# Patient Record
Sex: MALE | Race: WHITE | Employment: FULL TIME | URBAN - METROPOLITAN AREA
[De-identification: names, ages, dates, MRNs, and addresses within clinical notes are randomized per-mention and may not be internally consistent; named-entity substitution may affect disease eponyms.]

---

## 2017-04-11 ENCOUNTER — HOSPITAL ENCOUNTER (OUTPATIENT)
Age: 37
Discharge: HOME | End: 2017-04-11
Attending: EMERGENCY MEDICINE
Payer: MEDICAID

## 2017-04-11 ENCOUNTER — APPOINTMENT (OUTPATIENT)
Dept: GENERAL RADIOLOGY | Age: 37
End: 2017-04-11
Attending: EMERGENCY MEDICINE
Payer: MEDICAID

## 2017-04-11 VITALS
WEIGHT: 138 LBS | SYSTOLIC BLOOD PRESSURE: 133 MMHG | DIASTOLIC BLOOD PRESSURE: 83 MMHG | HEIGHT: 67 IN | RESPIRATION RATE: 16 BRPM | HEART RATE: 84 BPM | TEMPERATURE: 99 F | BODY MASS INDEX: 21.66 KG/M2 | OXYGEN SATURATION: 98 %

## 2017-04-11 DIAGNOSIS — S60.022A CONTUSION OF LEFT INDEX FINGER WITHOUT DAMAGE TO NAIL, INITIAL ENCOUNTER: Primary | ICD-10-CM

## 2017-04-11 PROCEDURE — 73140 X-RAY EXAM OF FINGER(S): CPT

## 2017-04-11 PROCEDURE — 99203 OFFICE O/P NEW LOW 30 MIN: CPT

## 2017-04-11 NOTE — ED PROVIDER NOTES
No chief complaint on file. HPI:     Mary Monsivais is a 43year old male who presents with chief complaint of finger injury. A couple of days ago pt got the tip of his L index finger caught between two [employer]. He has iced it.   States toda dislocations or significant arthropathy noted. No joint effusion. No evidence of underlying osseous or soft tissue lesions.  No soft tissue swelling.      4/11/2017  CONCLUSION:  Negative exam.    Dictated by: Star Scott DO on 4/11/2017 at 16:14     [place]

## 2017-11-01 ENCOUNTER — HOSPITAL ENCOUNTER (OUTPATIENT)
Age: 37
Discharge: HOME | End: 2017-11-01
Attending: FAMILY MEDICINE
Payer: MEDICAID

## 2017-11-01 VITALS
HEIGHT: 67 IN | DIASTOLIC BLOOD PRESSURE: 92 MMHG | BODY MASS INDEX: 22.76 KG/M2 | OXYGEN SATURATION: 98 % | RESPIRATION RATE: 18 BRPM | WEIGHT: 145 LBS | TEMPERATURE: 98 F | SYSTOLIC BLOOD PRESSURE: 147 MMHG | HEART RATE: 83 BPM

## 2017-11-01 DIAGNOSIS — J34.0 ABSCESS OF NASAL CAVITY: Primary | ICD-10-CM

## 2017-11-01 PROCEDURE — 87205 SMEAR GRAM STAIN: CPT | Performed by: FAMILY MEDICINE

## 2017-11-01 PROCEDURE — 87147 CULTURE TYPE IMMUNOLOGIC: CPT | Performed by: FAMILY MEDICINE

## 2017-11-01 PROCEDURE — 87070 CULTURE OTHR SPECIMN AEROBIC: CPT | Performed by: FAMILY MEDICINE

## 2017-11-01 PROCEDURE — 87186 SC STD MICRODIL/AGAR DIL: CPT | Performed by: FAMILY MEDICINE

## 2017-11-01 PROCEDURE — 99214 OFFICE O/P EST MOD 30 MIN: CPT

## 2017-11-01 RX ORDER — CLINDAMYCIN HYDROCHLORIDE 300 MG/1
300 CAPSULE ORAL 3 TIMES DAILY
Qty: 21 CAPSULE | Refills: 0 | Status: SHIPPED | OUTPATIENT
Start: 2017-11-01 | End: 2017-11-08

## 2017-11-01 NOTE — ED PROVIDER NOTES
Patient Seen in: [address] [facility]    History   No chief complaint on file.     Stated Complaint: maybe mrsa/rash    HPI    Patient with a past medical history cinnamon for diabetes type 1, rheumatoid arthritis, and MRSA has some dental work with tender subcutaneous erythematous nodule and yellow crusting. Left ear is clear. Mouth is moist.  Right upper gum with no erythema. Tenderness on palpation of the right upper gum above the canine and incisors.   LAD: Anterior cervical lymphadenopathy

## 2017-11-04 NOTE — ED NOTES
Left message for patient to return call regarding final cx results. Patient to continue abx and complete course.

## 2017-11-04 NOTE — ED NOTES
Patient returning call. Patient informed of final cx results. Patient instructed to continue and finish abx and to follow up with pcp if no improvement. Patient states he is still not feeling great, but has not worsened since seen here.  Patient asking if h

## 2017-11-04 NOTE — ED NOTES
Patient on Clindamycin. Noted. Sensitive. Patient previously notified and with follow-up with PCP.    AEROBIC BACTERIAL CULTURE   Order: [id number]   Status:  Preliminary result   Visible to patient:  No (Not Released)   Specimen Information: Nose right cont

## (undated) NOTE — ED AVS SNAPSHOT
[facility] in [address] [id number]    Phone:  [phone]    Fax:  [phone]           Mr. Merari Feliciano   MRN: [id number]    [facility]   Date of Visit:  4/11/2017 from our patient liason soon after your visit. Also, some patients receive a detailed feedback survey mailed to them a week after the visit. If you receive this, we would really appreciate it if you could take the time to complete it. Thank you!       You [facility] [address] [facility] [id number] ([address]) [facility] [address] [facility] ([address]) [facility] FINDINGS:  No evidence of acute fractures, subluxations or dislocations or significant arthropathy noted. No joint effusion. No evidence of underlying osseous or soft tissue lesions. No soft tissue swelling.              MyChart     Sign up for julianna Núñez

## (undated) NOTE — LETTER
[facility] [place]  [id number]  Dept: [phone]  Dept Fax: [phone]      November 1, 2017    Patient: Israel Or   Date of Visit: 11/1/2017       To Whom It May Concern:    Israel Or was see